# Patient Record
Sex: MALE | Race: WHITE | Employment: FULL TIME | URBAN - METROPOLITAN AREA
[De-identification: names, ages, dates, MRNs, and addresses within clinical notes are randomized per-mention and may not be internally consistent; named-entity substitution may affect disease eponyms.]

---

## 2023-03-26 ENCOUNTER — HOSPITAL ENCOUNTER (EMERGENCY)
Age: 53
Discharge: HOME | End: 2023-03-26
Attending: EMERGENCY MEDICINE
Payer: COMMERCIAL

## 2023-03-26 VITALS
TEMPERATURE: 98.2 F | OXYGEN SATURATION: 97 % | WEIGHT: 222 LBS | BODY MASS INDEX: 31.08 KG/M2 | RESPIRATION RATE: 18 BRPM | SYSTOLIC BLOOD PRESSURE: 134 MMHG | HEIGHT: 71 IN | DIASTOLIC BLOOD PRESSURE: 93 MMHG | HEART RATE: 95 BPM

## 2023-03-26 DIAGNOSIS — K59.00 CONSTIPATION, UNSPECIFIED CONSTIPATION TYPE: ICD-10-CM

## 2023-03-26 DIAGNOSIS — K57.92 DIVERTICULITIS: Primary | ICD-10-CM

## 2023-03-26 PROCEDURE — 99283 EMERGENCY DEPT VISIT LOW MDM: CPT

## 2023-03-26 RX ORDER — METRONIDAZOLE 500 MG/1
500 TABLET ORAL 3 TIMES DAILY
Qty: 30 TABLET | Refills: 0 | Status: SHIPPED | OUTPATIENT
Start: 2023-03-26 | End: 2023-04-05

## 2023-03-26 RX ORDER — CIPROFLOXACIN 500 MG/1
500 TABLET ORAL 2 TIMES DAILY
Qty: 20 TABLET | Refills: 0 | Status: SHIPPED | OUTPATIENT
Start: 2023-03-26 | End: 2023-04-05

## 2023-03-26 NOTE — ED PROVIDER NOTES
EMERGENCY DEPARTMENT HISTORY AND PHYSICAL EXAM      Date: 3/26/2023  Patient Name: Dioni Michel    History of Presenting Illness     Chief Complaint   Patient presents with    Abdominal Pain    Constipation    Diarrhea       History Provided By: Patient    HPI: Dioni Michel, 47 y.o. male presents to the ED with CC of some crampy abdominal discomfort with constipation and occasional diarrhea. Patient states that he was diagnosed with diverticulitis last week was having difficulty with bowel movements and then took a laxative and got minimal results from that. Patient denies any fever, chills, nausea, vomiting, chest pain, shortness of breath, rash, headache, night sweats. Symptoms are mild. He has not treated with anything today. He states that when he is constipated he also has difficulty urinating. Patient denies SOB, chest pain, or any neurological symptoms. There are no other complaints, changes, or physical findings at this time. Past History     Past Medical History:  No past medical history on file. Allergies:  No Known Allergies    Review of Systems   Vital signs and nursing notes reviewed  Review of Systems   Constitutional:  Negative for chills and fever. Gastrointestinal:  Positive for abdominal pain, constipation and diarrhea. All other systems reviewed and are negative. Physical Exam   Visit Vitals  BP (!) 134/93 (BP 1 Location: Right upper arm, BP Patient Position: Sitting)   Pulse 95   Temp 98.2 °F (36.8 °C)   Resp 18   Ht 5' 11\" (1.803 m)   Wt 100.7 kg (222 lb)   SpO2 97%   BMI 30.96 kg/m²     CONSTITUTIONAL: Alert, in no distress. Appears stated age. HEAD:  Normocephalic, atraumatic  EYES: EOM intact. No conjunctival injection or scleral icterus  Neck:  Supple. No meningismus  RESP: Normal with no work of breathing, speaking in full sentences. CV: Well perfused.    NEURO: Alert with normal mentation, moving extremities spontaneously  MSK: FROM of all extremities without neuro, motor, vascular or sensory embarassment  ENT: clear without erythema, exudate or edema  Abdomen: Soft nontender nondistended with normal active bowel sounds  Medical Decision Making     ED Course:   Initial assessment performed. The patients presenting problems have been discussed, and they are in agreement with the care plan formulated and outlined with them. I have encouraged them to ask questions as they arise throughout their visit. [facility]. He has no allergies to Cipro or Flagyl and is conveying that he has minimal improvement from his diverticulitis on Augmentin. Will provide patient antibiotics for Cipro and Flagyl, will discontinue Augmentin, and will treat patient with a bowel prep. Critical Care Time: None    Disposition:  DISCHARGE NOTE:  The pt is ready for discharge. The pt's signs, symptoms, diagnosis, and discharge instructions have been discussed and pt has conveyed their understanding. The pt is to follow up as recommended or return to ER should their symptoms worsen. Plan has been discussed and pt is in agreement. PLAN:  1. There are no discharge medications for this patient. 2.   Follow-up Information       Follow up With Specialties Details Why [facility] EMERGENCY DEPT Emergency Medicine Call in 2 days  [address] [facility] [address] [phone]          3. Take Tylenol or Ibuprofen as needed  4. Drink plenty of fluids  5. Return to ED if worse especially if any shortness of breath, chest pain or altered mentation. Diagnosis     Clinical Impression:   1. Diverticulitis    2. Constipation, unspecified constipation type            Please note that this dictation was completed with [employer] the [employer] voice recognition software. Quite often unanticipated grammatical, syntax, homophones, and other interpretive errors are inadvertently transcribed by the computer software.  Please disregards these errors. Please excuse any errors that have escaped final proofreading.

## 2023-03-26 NOTE — ED TRIAGE NOTES
Pt was seen at [facility] x[facility]wk ago and was given abx for diverticulitis but symptoms have not improved. States he feels \"blocked\" like he cant have BM then has liquid stool, and goes back and forth with this pattern. Also that he has trouble urinating when he feels blocked.

## 2023-03-26 NOTE — Clinical Note
[address] [facility] EMERGENCY DEPT  [address] [place] [address] [place] [id number]  [phone]    Work/School Note    Date: 3/26/2023    To Whom It May concern:    Natalia Yee was seen and treated today in the emergency room by the following provider(s):  Attending Provider: Rachel House MD.      Natalia Yee is excused from work/school on 3/26/2023 through 3/28/2023. He is medically clear to return to work/school on 3/29/2023.          Sincerely,          Jez Ramirez MD

## 2024-06-02 ENCOUNTER — APPOINTMENT (OUTPATIENT)
Facility: HOSPITAL | Age: 54
End: 2024-06-02
Payer: COMMERCIAL

## 2024-06-02 ENCOUNTER — HOSPITAL ENCOUNTER (EMERGENCY)
Facility: HOSPITAL | Age: 54
Discharge: HOME | End: 2024-06-02
Attending: STUDENT IN AN ORGANIZED HEALTH CARE EDUCATION/TRAINING PROGRAM
Payer: COMMERCIAL

## 2024-06-02 VITALS
DIASTOLIC BLOOD PRESSURE: 80 MMHG | WEIGHT: 205 LBS | OXYGEN SATURATION: 100 % | RESPIRATION RATE: 18 BRPM | HEART RATE: 74 BPM | BODY MASS INDEX: 28.7 KG/M2 | SYSTOLIC BLOOD PRESSURE: 138 MMHG | HEIGHT: 71 IN | TEMPERATURE: 98 F

## 2024-06-02 DIAGNOSIS — N20.0 KIDNEY STONE: Primary | ICD-10-CM

## 2024-06-02 LAB
ALBUMIN SERPL-MCNC: 3.5 G/DL (ref 3.5–5)
ALBUMIN/GLOB SERPL: 1.1 (ref 1.1–2.2)
ALP SERPL-CCNC: 53 U/L (ref 45–117)
ALT SERPL-CCNC: 25 U/L (ref 12–78)
ANION GAP SERPL CALC-SCNC: 2 MMOL/L (ref 5–15)
APPEARANCE UR: CLEAR
AST SERPL W P-5'-P-CCNC: 14 U/L (ref 15–37)
BACTERIA URNS QL MICRO: NEGATIVE /HPF
BASOPHILS # BLD: 0.1 K/UL (ref 0–0.1)
BASOPHILS NFR BLD: 1 % (ref 0–1)
BILIRUB SERPL-MCNC: 0.5 MG/DL (ref 0.2–1)
BILIRUB UR QL: NEGATIVE
BUN SERPL-MCNC: 15 MG/DL (ref 6–20)
BUN/CREAT SERPL: 15 (ref 12–20)
CA-I BLD-MCNC: 8.8 MG/DL (ref 8.5–10.1)
CHLORIDE SERPL-SCNC: 108 MMOL/L (ref 97–108)
CO2 SERPL-SCNC: 33 MMOL/L (ref 21–32)
COLOR UR: ABNORMAL
CREAT SERPL-MCNC: 0.97 MG/DL (ref 0.7–1.3)
DIFFERENTIAL METHOD BLD: NORMAL
EOSINOPHIL # BLD: 0.2 K/UL (ref 0–0.4)
EOSINOPHIL NFR BLD: 4 % (ref 0–7)
EPITH CASTS URNS QL MICRO: ABNORMAL /LPF
ERYTHROCYTE [DISTWIDTH] IN BLOOD BY AUTOMATED COUNT: 12.1 % (ref 11.5–14.5)
GLOBULIN SER CALC-MCNC: 3.2 G/DL (ref 2–4)
GLUCOSE SERPL-MCNC: 157 MG/DL (ref 65–100)
GLUCOSE UR STRIP.AUTO-MCNC: NEGATIVE MG/DL
HCT VFR BLD AUTO: 43.9 % (ref 36.6–50.3)
HGB BLD-MCNC: 15.5 G/DL (ref 12.1–17)
HGB UR QL STRIP: NEGATIVE
IMM GRANULOCYTES # BLD AUTO: 0 K/UL (ref 0–0.04)
IMM GRANULOCYTES NFR BLD AUTO: 0 % (ref 0–0.5)
KETONES UR QL STRIP.AUTO: NEGATIVE MG/DL
LEUKOCYTE ESTERASE UR QL STRIP.AUTO: NEGATIVE
LIPASE SERPL-CCNC: 36 U/L (ref 13–75)
LYMPHOCYTES # BLD: 1.4 K/UL (ref 0.8–3.5)
LYMPHOCYTES NFR BLD: 20 % (ref 12–49)
MCH RBC QN AUTO: 31.9 PG (ref 26–34)
MCHC RBC AUTO-ENTMCNC: 35.3 G/DL (ref 30–36.5)
MCV RBC AUTO: 90.3 FL (ref 80–99)
MONOCYTES # BLD: 0.5 K/UL (ref 0–1)
MONOCYTES NFR BLD: 7 % (ref 5–13)
NEUTS SEG # BLD: 4.6 K/UL (ref 1.8–8)
NEUTS SEG NFR BLD: 68 % (ref 32–75)
NITRITE UR QL STRIP.AUTO: NEGATIVE
NRBC # BLD: 0 K/UL (ref 0–0.01)
NRBC BLD-RTO: 0 PER 100 WBC
PH UR STRIP: 8 (ref 5–8)
PLATELET # BLD AUTO: 166 K/UL (ref 150–400)
PMV BLD AUTO: 11.2 FL (ref 8.9–12.9)
POTASSIUM SERPL-SCNC: 3.8 MMOL/L (ref 3.5–5.1)
PROT SERPL-MCNC: 6.7 G/DL (ref 6.4–8.2)
PROT UR STRIP-MCNC: NEGATIVE MG/DL
RBC # BLD AUTO: 4.86 M/UL (ref 4.1–5.7)
RBC #/AREA URNS HPF: ABNORMAL /HPF (ref 0–5)
SODIUM SERPL-SCNC: 143 MMOL/L (ref 136–145)
SP GR UR REFRACTOMETRY: >1.03 (ref 1–1.03)
URINE CULTURE IF INDICATED: ABNORMAL
UROBILINOGEN UR QL STRIP.AUTO: 0.1 EU/DL (ref 0.1–1)
WBC # BLD AUTO: 6.9 K/UL (ref 4.1–11.1)
WBC URNS QL MICRO: ABNORMAL /HPF (ref 0–4)

## 2024-06-02 PROCEDURE — 74177 CT ABD & PELVIS W/CONTRAST: CPT

## 2024-06-02 PROCEDURE — 83690 ASSAY OF LIPASE: CPT

## 2024-06-02 PROCEDURE — 36415 COLL VENOUS BLD VENIPUNCTURE: CPT

## 2024-06-02 PROCEDURE — 99285 EMERGENCY DEPT VISIT HI MDM: CPT

## 2024-06-02 PROCEDURE — 6360000004 HC RX CONTRAST MEDICATION: Performed by: STUDENT IN AN ORGANIZED HEALTH CARE EDUCATION/TRAINING PROGRAM

## 2024-06-02 PROCEDURE — 6370000000 HC RX 637 (ALT 250 FOR IP): Performed by: STUDENT IN AN ORGANIZED HEALTH CARE EDUCATION/TRAINING PROGRAM

## 2024-06-02 PROCEDURE — 81001 URINALYSIS AUTO W/SCOPE: CPT

## 2024-06-02 PROCEDURE — 96361 HYDRATE IV INFUSION ADD-ON: CPT

## 2024-06-02 PROCEDURE — 2580000003 HC RX 258: Performed by: STUDENT IN AN ORGANIZED HEALTH CARE EDUCATION/TRAINING PROGRAM

## 2024-06-02 PROCEDURE — 85025 COMPLETE CBC W/AUTO DIFF WBC: CPT

## 2024-06-02 PROCEDURE — 80053 COMPREHEN METABOLIC PANEL: CPT

## 2024-06-02 PROCEDURE — 96374 THER/PROPH/DIAG INJ IV PUSH: CPT

## 2024-06-02 PROCEDURE — 6360000002 HC RX W HCPCS: Performed by: STUDENT IN AN ORGANIZED HEALTH CARE EDUCATION/TRAINING PROGRAM

## 2024-06-02 RX ORDER — OXYCODONE HYDROCHLORIDE 5 MG/1
5 TABLET ORAL EVERY 6 HOURS PRN
Qty: 7 TABLET | Refills: 0 | Status: SHIPPED | OUTPATIENT
Start: 2024-06-02 | End: 2024-06-05

## 2024-06-02 RX ORDER — TAMSULOSIN HYDROCHLORIDE 0.4 MG/1
0.4 CAPSULE ORAL DAILY
Qty: 90 CAPSULE | Refills: 1 | Status: SHIPPED | OUTPATIENT
Start: 2024-06-02

## 2024-06-02 RX ORDER — ONDANSETRON 2 MG/ML
4 INJECTION INTRAMUSCULAR; INTRAVENOUS ONCE
Status: DISCONTINUED | OUTPATIENT
Start: 2024-06-02 | End: 2024-06-02 | Stop reason: HOSPADM

## 2024-06-02 RX ORDER — 0.9 % SODIUM CHLORIDE 0.9 %
1000 INTRAVENOUS SOLUTION INTRAVENOUS ONCE
Status: COMPLETED | OUTPATIENT
Start: 2024-06-02 | End: 2024-06-02

## 2024-06-02 RX ORDER — TAMSULOSIN HYDROCHLORIDE 0.4 MG/1
0.4 CAPSULE ORAL
Status: COMPLETED | OUTPATIENT
Start: 2024-06-02 | End: 2024-06-02

## 2024-06-02 RX ORDER — KETOROLAC TROMETHAMINE 15 MG/ML
15 INJECTION, SOLUTION INTRAMUSCULAR; INTRAVENOUS
Status: COMPLETED | OUTPATIENT
Start: 2024-06-02 | End: 2024-06-02

## 2024-06-02 RX ADMIN — IOPAMIDOL 100 ML: 755 INJECTION, SOLUTION INTRAVENOUS at 07:13

## 2024-06-02 RX ADMIN — TAMSULOSIN HYDROCHLORIDE 0.4 MG: 0.4 CAPSULE ORAL at 09:40

## 2024-06-02 RX ADMIN — SODIUM CHLORIDE 1000 ML: 9 INJECTION, SOLUTION INTRAVENOUS at 06:50

## 2024-06-02 RX ADMIN — KETOROLAC TROMETHAMINE 15 MG: 15 INJECTION, SOLUTION INTRAMUSCULAR; INTRAVENOUS at 07:59

## 2024-06-02 ASSESSMENT — PAIN - FUNCTIONAL ASSESSMENT
PAIN_FUNCTIONAL_ASSESSMENT: 0-10
PAIN_FUNCTIONAL_ASSESSMENT: 0-10

## 2024-06-02 ASSESSMENT — PAIN DESCRIPTION - LOCATION: LOCATION: ABDOMEN

## 2024-06-02 ASSESSMENT — LIFESTYLE VARIABLES
HOW MANY STANDARD DRINKS CONTAINING ALCOHOL DO YOU HAVE ON A TYPICAL DAY: 1 OR 2
HOW OFTEN DO YOU HAVE A DRINK CONTAINING ALCOHOL: MONTHLY OR LESS

## 2024-06-02 ASSESSMENT — PAIN SCALES - GENERAL
PAINLEVEL_OUTOF10: 0
PAINLEVEL_OUTOF10: 3

## 2024-06-02 NOTE — DISCHARGE INSTRUCTIONS
Absolute 4.6 1.8 - 8.0 K/UL    Lymphocytes Absolute 1.4 0.8 - 3.5 K/UL    Monocytes Absolute 0.5 0.0 - 1.0 K/UL    Eosinophils Absolute 0.2 0.0 - 0.4 K/UL    Basophils Absolute 0.1 0.0 - 0.1 K/UL    Immature Granulocytes Absolute 0.0 0.00 - 0.04 K/UL    Differential Type AUTOMATED     Lipase    Collection Time: 06/02/24  6:43 AM   Result Value Ref Range    Lipase 36 13 - 75 U/L       Radiologic Studies  CT ABDOMEN PELVIS W IV CONTRAST Additional Contrast? None   Final Result   Delayed left nephrogram and mild left hydronephrosis and hydroureter.    Nonobstructing left renal calculus calculus.   Diverticulosis without diverticulitis.   Incidental prostamegaly        ------------------------------------------------------------------------------------------------------------  The evaluation and treatment you received in the Emergency Department were for an urgent problem. It is important that you follow-up with a doctor, nurse practitioner, or physician assistant to:  (1) confirm your diagnosis,  (2) re-evaluation of changes in your illness and treatment, and (3) for ongoing care. Please take your discharge instructions with you when you go to your follow-up appointment.     If you have any problem arranging a follow-up appointment, contact us!  If your symptoms become worse or you do not improve as expected, please return to us. We are available 24 hours a day.     If a prescription has been provided, please fill it as soon as possible to prevent a delay in treatment. If you have any questions or reservations about taking the medication due to side effects or interactions with other medications, please call your primary care provider or contact us directly.  Again, THANK YOU for choosing us to care for YOU!

## 2024-06-02 NOTE — ED PROVIDER NOTES
Range    Lipase 36 13 - 75 U/L       EKG:.Not Applicable    Radiologic Studies:  Non-plain film images such as CT, Ultrasound and MRI are read by the radiologist. Plain radiographic images are visualized and preliminarily interpreted by the ED Provider with the following findings: Not Applicable.    Interpretation per the Radiologist below, if available at the time of this note:  CT ABDOMEN PELVIS W IV CONTRAST Additional Contrast? None    (Results Pending)        ED COURSE and DIFFERENTIAL DIAGNOSIS/MDM   7:59 AM Differential and Considerations:     53-year-old male with history of diverticulitis who presents emergency department complaining of abdominal pain x 1 day.  Patient well-appearing, vital signs stable, with benign abdominal exam.  Will obtain basic labs and CT abdomen pelvis to further evaluate.  Will provide IV fluid, antiemetic and pain control here.  Dispo pending results.    Records Reviewed (source and summary of external notes): Prior medical records and Nursing notes.    Vitals:    Vitals:    06/02/24 0630   BP: (!) 155/94   Pulse: 92   Resp: 18   Temp: 97.9 °F (36.6 °C)   TempSrc: Oral   SpO2: 97%   Weight: 93 kg (205 lb)   Height: 1.803 m (5' 11\")        ED COURSE       SEPSIS Reassessment: Sepsis reassessment not applicable    Clinical Management Tools:  Not Applicable    Patient was given the following medications:  Medications   ondansetron (ZOFRAN) injection 4 mg (4 mg IntraVENous Not Given 6/2/24 0650)   sodium chloride 0.9 % bolus 1,000 mL (0 mLs IntraVENous Stopped 6/2/24 0757)   iopamidol (ISOVUE-370) 76 % injection 100 mL (100 mLs IntraVENous Given 6/2/24 0713)   ketorolac (TORADOL) injection 15 mg (15 mg IntraVENous Given 6/2/24 0759)       CONSULTS: See ED Course/MDM for further details.  None     Social Determinants affecting Diagnosis/Treatment: None    Smoking Cessation: Not Applicable    PROCEDURES   Unless otherwise noted above, none  Procedures      CRITICAL CARE TIME   Patient

## 2024-06-02 NOTE — ED TRIAGE NOTES
Constant dull Lower abd pain constipation started 2 days ago. Last bm yest afternoon. Pt thinks it is a flare up of diverticulitis. Denies urinary sx.

## 2024-06-02 NOTE — ED NOTES
Provider at bedside for dispo and follow up. Discharge plan reviewed and paperwork signed, teaching completed including treatment received, medications and follow up plan of care, pain level within manageable comfortable limits, IV removed, ambulatory to exit, gait steady, safety maintained.

## 2024-07-08 PROBLEM — N20.0 LEFT RENAL STONE: Status: ACTIVE | Noted: 2024-07-08

## 2024-07-12 ENCOUNTER — OFFICE VISIT (OUTPATIENT)
Age: 54
End: 2024-07-12
Payer: COMMERCIAL

## 2024-07-12 VITALS
OXYGEN SATURATION: 98 % | WEIGHT: 198 LBS | DIASTOLIC BLOOD PRESSURE: 96 MMHG | BODY MASS INDEX: 28.35 KG/M2 | HEART RATE: 82 BPM | SYSTOLIC BLOOD PRESSURE: 134 MMHG | HEIGHT: 70 IN

## 2024-07-12 DIAGNOSIS — R10.9 RIGHT FLANK PAIN: ICD-10-CM

## 2024-07-12 DIAGNOSIS — N20.0 LEFT RENAL STONE: Primary | ICD-10-CM

## 2024-07-12 LAB
BILIRUBIN, URINE, POC: NEGATIVE
BLOOD URINE, POC: NEGATIVE
GLUCOSE URINE, POC: NEGATIVE
KETONES, URINE, POC: NEGATIVE
LEUKOCYTE ESTERASE, URINE, POC: NEGATIVE
NITRITE, URINE, POC: NEGATIVE
PH, URINE, POC: 7 (ref 4.6–8)
PROTEIN,URINE, POC: NEGATIVE
SPECIFIC GRAVITY, URINE, POC: 1.02 (ref 1–1.03)
URINALYSIS CLARITY, POC: CLEAR
URINALYSIS COLOR, POC: YELLOW
UROBILINOGEN, POC: NORMAL

## 2024-07-12 PROCEDURE — 99204 OFFICE O/P NEW MOD 45 MIN: CPT | Performed by: STUDENT IN AN ORGANIZED HEALTH CARE EDUCATION/TRAINING PROGRAM

## 2024-07-12 PROCEDURE — 81003 URINALYSIS AUTO W/O SCOPE: CPT | Performed by: STUDENT IN AN ORGANIZED HEALTH CARE EDUCATION/TRAINING PROGRAM

## 2024-07-12 RX ORDER — OXYCODONE HYDROCHLORIDE 5 MG/1
5 TABLET ORAL EVERY 6 HOURS PRN
Qty: 8 TABLET | Refills: 0 | Status: SHIPPED | OUTPATIENT
Start: 2024-07-12 | End: 2024-07-14

## 2024-07-12 NOTE — PROGRESS NOTES
Chief Complaint   Patient presents with    New Patient    Nephrolithiasis     1. Have you been to the ER, urgent care clinic since your last visit?  Hospitalized since your last visit?Yes When: 6/2/24 Where: [facility] Reason for visit: flank pain    2. Have you seen or consulted any other health care providers outside of the [facility] since your last visit?  Include any pap smears or colon screening. No  BP (!) 134/96 (Site: Left Upper Arm, Position: Sitting, Cuff Size: Medium Adult)   Pulse 82   Ht 1.778 m (5' 10\")   Wt 89.8 kg (198 lb)   SpO2 98%   BMI 28.41 kg/m²

## 2024-07-12 NOTE — ASSESSMENT & PLAN NOTE
Unknown cause of right flank pain.  CT scan with some mild dilation of the collecting system.  Plan for cystoscopy bilateral retrograde pyelogram and possible stent placement in the operating room.  We reviewed the risks and benefits.

## 2024-07-12 NOTE — ASSESSMENT & PLAN NOTE
Nonobstructive stone in the lower pole of the left kidney measuring approximately 4 mm that is unlikely causing his flank pain.

## 2024-07-12 NOTE — PROGRESS NOTES
HISTORY OF PRESENT ILLNESS  Jase Stover is a 54 y.o. male.  Chief Complaint   Patient presents with    New Patient    Nephrolithiasis       54-year-old male referred for persistent right flank pain for the past 2 to 3 months.  He was seen in the emergency department and CT scan was consistent with delayed nephrogram and a kidney stone in the left kidney.  He states that his pain is usually on the right side and radiates from the right back through the right flank and down into the right lower quadrant.    Nephrolithiasis        PAST MEDICAL HISTORY:  PMHx (including negatives):  has a past medical history of Diverticulitis.   PSurgHx:  has no past surgical history on file.  PSocHx:  reports that he has been smoking cigarettes. He has never used smokeless tobacco. He reports current alcohol use. He reports that he does not currently use drugs.     Review of Systems      Physical Exam  Constitutional:       General: He is not in acute distress.     Appearance: Normal appearance. He is not ill-appearing.   HENT:      Head: Normocephalic and atraumatic.      Nose: Nose normal.   Eyes:      Extraocular Movements: Extraocular movements intact.      Pupils: Pupils are equal, round, and reactive to light.   Pulmonary:      Effort: Pulmonary effort is normal. No respiratory distress.   Abdominal:      General: There is no distension.      Comments: Right flank pain   Musculoskeletal:         General: No deformity.      Cervical back: Normal range of motion.   Skin:     Coloration: Skin is not jaundiced or pale.   Neurological:      General: No focal deficit present.      Mental Status: He is alert and oriented to person, place, and time.   Psychiatric:         Mood and Affect: Mood normal.     I independently reviewed the CT scan which shows a nonobstructive stone in the left kidney.  There is some mild hydronephrosis of the left side and delayed nephrogram.  No abnormalities of the right kidney.  The patient does have

## 2024-07-22 ENCOUNTER — PREP FOR PROCEDURE (OUTPATIENT)
Age: 54
End: 2024-07-22

## 2024-07-22 DIAGNOSIS — N20.0 RENAL CALCULUS: ICD-10-CM

## 2024-07-31 ENCOUNTER — TELEPHONE (OUTPATIENT)
Age: 54
End: 2024-07-31

## 2024-07-31 NOTE — TELEPHONE ENCOUNTER
Patient stated he hasn't had anymore pain since a few days after his appointment on 7-12-24. He's cancelling his procedure & will call back if needed.

## 2025-06-24 PROBLEM — R97.20 ELEVATED PSA: Status: ACTIVE | Noted: 2025-06-24

## 2025-06-24 PROBLEM — N40.0 BPH (BENIGN PROSTATIC HYPERPLASIA): Status: ACTIVE | Noted: 2025-06-24

## 2025-06-26 ENCOUNTER — OFFICE VISIT (OUTPATIENT)
Age: 55
End: 2025-06-26
Payer: COMMERCIAL

## 2025-06-26 VITALS
HEIGHT: 71 IN | HEART RATE: 82 BPM | WEIGHT: 180 LBS | BODY MASS INDEX: 25.2 KG/M2 | SYSTOLIC BLOOD PRESSURE: 133 MMHG | OXYGEN SATURATION: 94 % | DIASTOLIC BLOOD PRESSURE: 66 MMHG

## 2025-06-26 DIAGNOSIS — N40.1 BENIGN PROSTATIC HYPERPLASIA WITH LOWER URINARY TRACT SYMPTOMS, SYMPTOM DETAILS UNSPECIFIED: ICD-10-CM

## 2025-06-26 DIAGNOSIS — R97.20 ELEVATED PSA: Primary | ICD-10-CM

## 2025-06-26 PROCEDURE — 99213 OFFICE O/P EST LOW 20 MIN: CPT | Performed by: STUDENT IN AN ORGANIZED HEALTH CARE EDUCATION/TRAINING PROGRAM

## 2025-06-26 RX ORDER — SULFAMETHOXAZOLE AND TRIMETHOPRIM 800; 160 MG/1; MG/1
TABLET ORAL
COMMUNITY
Start: 2025-06-08

## 2025-06-26 NOTE — PROGRESS NOTES
Chief Complaint   Patient presents with    Follow-up    Elevated PSA     1. Have you been to the ER, urgent care clinic since your last visit?  Hospitalized since your last visit?No    2. Have you seen or consulted any other health care providers outside of the [facility] since your last visit?  Include any pap smears or colon screening. No    /66   Pulse 82   Ht 1.791 m (5' 10.5\")   Wt 81.6 kg (180 lb)   SpO2 94%   BMI 25.46 kg/m²

## 2025-06-26 NOTE — PROGRESS NOTES
HISTORY OF PRESENT ILLNESS    History of Present Illness  The patient is a 54-year-old male with elevated PSA. He was treated for presumed prostatitis with antibiotics; his PSA was initially greater than 7 and on recheck was at 5.    He was diagnosed with prostatitis a year ago. His initial symptoms included incomplete bladder emptying, a burning sensation, and pain. He was treated with antibiotics, and his symptoms improved. Currently, he has no pain but has experienced no ejaculation during sexual activity for the past 10 months. He discontinued tamsulosin due to lack of relief.    He has had no recurrent flank pain or kidney stones since July 2024. He attributes his condition to long working hours, excessive coffee consumption, and minimal water intake.    SOCIAL HISTORY  -  in construction    PAST MEDICAL HISTORY:  PMHx (including negatives):  has a past medical history of Diverticulitis.   PSurgHx:  has no past surgical history on file.  PSocHx:  reports that he has been smoking cigarettes. He has never used smokeless tobacco. He reports current alcohol use. He reports that he does not currently use drugs.     Physical Exam  General: Well-appearing, no acute distress  CV: Well perfused, regular rate  Lungs: Normal work of breathing  Abdomen: Soft, Non-distended      Results  PSA  PSA was initially greater than 7 and on recheck was at 5.    I personally reviewed and interpreted all available imaging and results with the patient today.     Assessment & Plan  1. Elevated PSA: Acute.  - Continue antibiotics for 1-2 weeks.  - Re-evaluate PSA in 2 months.  - Consider CT scan of kidneys and bladder if symptoms persist.  - Discussed potential costs of additional tests.    2. Retrograde Ejaculation.  - Possible side effect of tamsulosin.  - Not currently taking tamsulosin.  - Discussed mechanism and resolution post-infection and discontinuation of tamsulosin.  - Advised recurrence if tamsulosin is

## 2025-07-26 DIAGNOSIS — N40.1 BENIGN PROSTATIC HYPERPLASIA WITH LOWER URINARY TRACT SYMPTOMS, SYMPTOM DETAILS UNSPECIFIED: ICD-10-CM

## 2025-07-26 DIAGNOSIS — R97.20 ELEVATED PSA: ICD-10-CM

## 2025-08-29 ENCOUNTER — OFFICE VISIT (OUTPATIENT)
Age: 55
End: 2025-08-29
Payer: COMMERCIAL

## 2025-08-29 VITALS
HEIGHT: 70 IN | HEART RATE: 69 BPM | BODY MASS INDEX: 25.77 KG/M2 | OXYGEN SATURATION: 97 % | DIASTOLIC BLOOD PRESSURE: 85 MMHG | SYSTOLIC BLOOD PRESSURE: 142 MMHG | WEIGHT: 180 LBS

## 2025-08-29 DIAGNOSIS — Z12.5 SCREENING FOR PROSTATE CANCER: ICD-10-CM

## 2025-08-29 DIAGNOSIS — R97.20 ELEVATED PSA: Primary | ICD-10-CM

## 2025-08-29 PROCEDURE — 99213 OFFICE O/P EST LOW 20 MIN: CPT | Performed by: STUDENT IN AN ORGANIZED HEALTH CARE EDUCATION/TRAINING PROGRAM

## 2025-08-30 LAB — PSA SERPL-MCNC: 3.2 NG/ML (ref 0–4)
